# Patient Record
Sex: FEMALE | Race: WHITE | NOT HISPANIC OR LATINO | Employment: FULL TIME | URBAN - METROPOLITAN AREA
[De-identification: names, ages, dates, MRNs, and addresses within clinical notes are randomized per-mention and may not be internally consistent; named-entity substitution may affect disease eponyms.]

---

## 2020-08-30 ENCOUNTER — APPOINTMENT (OUTPATIENT)
Dept: RADIOLOGY | Facility: IMAGING CENTER | Age: 40
End: 2020-08-30
Attending: PHYSICIAN ASSISTANT
Payer: COMMERCIAL

## 2020-08-30 ENCOUNTER — OFFICE VISIT (OUTPATIENT)
Dept: URGENT CARE | Facility: CLINIC | Age: 40
End: 2020-08-30
Payer: COMMERCIAL

## 2020-08-30 VITALS
DIASTOLIC BLOOD PRESSURE: 72 MMHG | WEIGHT: 126 LBS | RESPIRATION RATE: 16 BRPM | HEART RATE: 111 BPM | HEIGHT: 64 IN | OXYGEN SATURATION: 99 % | BODY MASS INDEX: 21.51 KG/M2 | TEMPERATURE: 98.4 F | SYSTOLIC BLOOD PRESSURE: 98 MMHG

## 2020-08-30 DIAGNOSIS — S99.912A INJURY OF LEFT ANKLE, INITIAL ENCOUNTER: ICD-10-CM

## 2020-08-30 DIAGNOSIS — S93.402A SPRAIN OF LEFT ANKLE, UNSPECIFIED LIGAMENT, INITIAL ENCOUNTER: ICD-10-CM

## 2020-08-30 PROCEDURE — 73610 X-RAY EXAM OF ANKLE: CPT | Mod: TC,LT | Performed by: PHYSICIAN ASSISTANT

## 2020-08-30 PROCEDURE — 99203 OFFICE O/P NEW LOW 30 MIN: CPT | Performed by: PHYSICIAN ASSISTANT

## 2020-08-30 RX ORDER — TEMAZEPAM 15 MG/1
15 CAPSULE ORAL NIGHTLY PRN
COMMUNITY

## 2020-08-30 SDOH — HEALTH STABILITY: MENTAL HEALTH: HOW OFTEN DO YOU HAVE A DRINK CONTAINING ALCOHOL?: NEVER

## 2020-08-30 ASSESSMENT — ENCOUNTER SYMPTOMS
EYE REDNESS: 0
LOSS OF MOTION: 0
SORE THROAT: 0
MUSCLE WEAKNESS: 0
TINGLING: 0
EYE DISCHARGE: 0
FEVER: 0
NAUSEA: 0
NUMBNESS: 0
LOSS OF SENSATION: 0
HEADACHES: 0
COUGH: 0
SHORTNESS OF BREATH: 0
INABILITY TO BEAR WEIGHT: 0
VOMITING: 0

## 2020-08-30 NOTE — PROGRESS NOTES
Subjective:      Aby Hatfield is a 40 y.o. female who presents with Ankle Injury (rolled left ankle happened earlier, while using the stairs)            Ankle Injury   The incident occurred 3 to 6 hours ago. The incident occurred at home. The injury mechanism was an inversion injury and a fall. The pain is present in the left ankle. The pain is mild. The pain has been constant since onset. Pertinent negatives include no inability to bear weight, loss of motion, loss of sensation, muscle weakness, numbness or tingling. The symptoms are aggravated by weight bearing. She has tried acetaminophen for the symptoms.     The patient presents to clinic secondary to an injury to her left ankle.  The patient states she was walking down the stairs early this morning when she rolled her left ankle.  The patient states she developed immediate pain to the left ankle following the injury.  The patient states she has gradually developed swelling of the left ankle.  She reports no associated bruising.  No redness.  No numbness, tingling, or weakness.  The patient notes increased pain with walking.  The patient has applied an Ace wrap to the left ankle for additional support.  She has also taken Tylenol for her current symptoms.    PMH:  has no past medical history on file.  MEDS:   Current Outpatient Medications:   •  temazepam (RESTORIL) 15 MG Cap, Take 15 mg by mouth at bedtime as needed for Sleep., Disp: , Rfl:   ALLERGIES:   Allergies   Allergen Reactions   • Onion      anaphylaxis   • Sulfa Drugs      anaphylaxis     SURGHX: History reviewed. No pertinent surgical history.  SOCHX:  reports that she has been smoking. She uses smokeless tobacco. She reports that she does not drink alcohol.  FH: Family history was reviewed, no pertinent findings to report      Review of Systems   Constitutional: Negative for fever.   HENT: Negative for congestion, ear pain and sore throat.    Eyes: Negative for discharge and redness.  "  Respiratory: Negative for cough and shortness of breath.    Cardiovascular: Negative for chest pain and leg swelling.   Gastrointestinal: Negative for nausea and vomiting.   Musculoskeletal: Positive for joint pain (left ankle).   Skin: Negative for rash.   Neurological: Negative for tingling, numbness and headaches.   All other systems reviewed and are negative.         Objective:     BP (!) 98/72   Pulse (!) 111   Temp 36.9 °C (98.4 °F)   Resp 16   Ht 1.626 m (5' 4\")   Wt 57.2 kg (126 lb)   SpO2 99%   BMI 21.63 kg/m²      Physical Exam  Constitutional:       General: She is not in acute distress.     Appearance: Normal appearance. She is well-developed. She is not ill-appearing.   HENT:      Head: Normocephalic and atraumatic.      Right Ear: External ear normal.      Left Ear: External ear normal.      Nose: Nose normal.   Eyes:      Extraocular Movements: Extraocular movements intact.      Conjunctiva/sclera: Conjunctivae normal.   Neck:      Musculoskeletal: Normal range of motion and neck supple.   Cardiovascular:      Rate and Rhythm: Normal rate.   Pulmonary:      Effort: Pulmonary effort is normal.   Musculoskeletal:      Comments:   Left Ankle:  Tenderness to the lateral aspect of left ankle overlying the lateral malleolus.  No tenderness to the anterior or medial aspects of the left ankle.  No tenderness overlying the Achilles tendon.  No swelling.  No ecchymosis.  No erythema.  No increased warmth.  ROM intact -the patient demonstrates full active range of motion of the left ankle  Neurovascular intact  Strength 5/5 -dorsiflexion/plantarflexion against resistance  Normal gait   Skin:     General: Skin is warm and dry.   Neurological:      Mental Status: She is alert and oriented to person, place, and time.            Progress:  Left Ankle XR:   XRs reviewed by me.   COMPARISON: None     FINDINGS:  There is no focal soft tissue swelling.     There is no acute displaced fracture. The ankle mortise " is symmetric and there is no syndesmotic widening.     IMPRESSION:  Normal ankle series.    Provided the patient with ankle splint for additional support/stabilization     Assessment/Plan:        1. Injury of left ankle, initial encounter  - DX-ANKLE 3+ VIEWS LEFT; Future    2. Sprain of left ankle, unspecified ligament, initial encounter    Differential diagnoses, supportive care, and indications for immediate follow-up discussed with patient.   Instructed to return to clinic or nearest emergency department for any change in condition, further concerns, or worsening of symptoms.    OTC NSAIDs for pain/discomfort   RICE  Wear brace for additional support  Weight-bearing as tolerated  Follow-up with PCP   Return to clinic or go tot he ED if symptoms worsen or fail to improve, or if the patient should develop worsening/increasing pain/tenderness, swelling, bruising, redness or warmth to the affected area, decreased ROM, numbness, tingling or weakness, difficulty walking, fever/chills, and/or any concerning symptoms.     Discussed plan with the patient, and she agrees to the above.     Please note that this dictation was created using voice recognition software. I have made every reasonable attempt to correct obvious errors, but I expect that there may be errors of grammar and possibly content that I did not discover before finalizing the note.

## 2021-10-11 ENCOUNTER — OFFICE VISIT (OUTPATIENT)
Dept: URGENT CARE | Facility: CLINIC | Age: 41
End: 2021-10-11
Payer: COMMERCIAL

## 2021-10-11 ENCOUNTER — APPOINTMENT (OUTPATIENT)
Dept: RADIOLOGY | Facility: IMAGING CENTER | Age: 41
End: 2021-10-11
Attending: STUDENT IN AN ORGANIZED HEALTH CARE EDUCATION/TRAINING PROGRAM
Payer: COMMERCIAL

## 2021-10-11 VITALS
OXYGEN SATURATION: 98 % | HEART RATE: 88 BPM | BODY MASS INDEX: 23.63 KG/M2 | DIASTOLIC BLOOD PRESSURE: 60 MMHG | TEMPERATURE: 98.3 F | HEIGHT: 64 IN | RESPIRATION RATE: 16 BRPM | SYSTOLIC BLOOD PRESSURE: 106 MMHG | WEIGHT: 138.4 LBS

## 2021-10-11 DIAGNOSIS — M79.672 LEFT FOOT PAIN: ICD-10-CM

## 2021-10-11 DIAGNOSIS — S92.515A CLOSED NONDISPLACED FRACTURE OF PROXIMAL PHALANX OF LESSER TOE OF LEFT FOOT, INITIAL ENCOUNTER: ICD-10-CM

## 2021-10-11 PROBLEM — G89.29 CHRONIC PAIN: Status: ACTIVE | Noted: 2021-01-08

## 2021-10-11 PROBLEM — G47.00 INSOMNIA: Status: ACTIVE | Noted: 2021-01-08

## 2021-10-11 PROCEDURE — 73630 X-RAY EXAM OF FOOT: CPT | Mod: TC,LT | Performed by: STUDENT IN AN ORGANIZED HEALTH CARE EDUCATION/TRAINING PROGRAM

## 2021-10-11 PROCEDURE — 99214 OFFICE O/P EST MOD 30 MIN: CPT | Mod: 25 | Performed by: STUDENT IN AN ORGANIZED HEALTH CARE EDUCATION/TRAINING PROGRAM

## 2021-10-11 RX ORDER — CEFDINIR 300 MG/1
300 CAPSULE ORAL
COMMUNITY
Start: 2021-09-03

## 2021-10-11 RX ORDER — KETOROLAC TROMETHAMINE 30 MG/ML
30 INJECTION, SOLUTION INTRAMUSCULAR; INTRAVENOUS ONCE
Status: COMPLETED | OUTPATIENT
Start: 2021-10-11 | End: 2021-10-11

## 2021-10-11 RX ADMIN — KETOROLAC TROMETHAMINE 30 MG: 30 INJECTION, SOLUTION INTRAMUSCULAR; INTRAVENOUS at 13:21

## 2021-10-11 NOTE — PROGRESS NOTES
"Subjective:   CHIEF COMPLAINT  Chief Complaint   Patient presents with   • Toe Injury     (L) little toe brusied, swollen, sharp x last time ; tripped over dog door       HPI  Aby Hatfield is a 41 y.o. female who presents with chief complaint of pain on her left fifth toe. The patient was stepping over a dog gate and hit her toe against a door frame.  She developed bruising and swelling.  Now she is having persistent pain aggravated with palpation and weightbearing.  She has tried ibuprofen 800 mg and Tylenol 1000 mg which not helped.  She is also tried using ice which is not helped.  No additional history of trauma to that region.  No history of surgery.    REVIEW OF SYSTEMS  General: no fever or chills  GI: no nausea or vomiting  See HPI for further details.    PAST MEDICAL HISTORY       SURGICAL HISTORY  patient denies any surgical history    ALLERGIES  Allergies   Allergen Reactions   • Onion Anaphylaxis     anaphylaxis  allergic to onions     • Aspirin      Other reaction(s): Other (See Comments)  \"acid reflux like reaction\".  States similar reaction as she had to onions.   • Iodine      Other reaction(s): .Unknown  topical iodine   • Sulfa Drugs Anaphylaxis     anaphylaxis  Other reaction(s): Unknown  Other reaction(s): Other (See Comments)  \"mom is allergic to sulfa\" - possiblel allergy       CURRENT MEDICATIONS  Home Medications     Reviewed by Geoff Wisdom Ass't (Medical Assistant) on 10/11/21 at 1159  Med List Status: <None>   Medication Last Dose Status   cefdinir (OMNICEF) 300 MG Cap Not Taking Active   temazepam (RESTORIL) 15 MG Cap Taking Active                SOCIAL HISTORY  Social History     Tobacco Use   • Smoking status: Current Some Day Smoker   • Smokeless tobacco: Former User   Vaping Use   • Vaping Use: Every day   • Substances: Nicotine   Substance and Sexual Activity   • Alcohol use: Not Currently     Comment: occ    • Drug use: Never   • Sexual activity: Not on file " "      FAMILY HISTORY  No family history on file.       Objective:   PHYSICAL EXAM  VITAL SIGNS: /60 (BP Location: Left arm, Patient Position: Sitting)   Pulse 88   Temp 36.8 °C (98.3 °F) (Temporal)   Resp 16   Ht 1.626 m (5' 4\")   Wt 62.8 kg (138 lb 6.4 oz)   SpO2 98%   BMI 23.76 kg/m²     Gen: no acute distress, normal voice  Psych: normal affect, normal judgement, alert, awake  Skin: dry, intact, moist mucosal membranes  Lungs: CTAB w/ symmetric expansion  CV: RRR w/o murmurs or clicks  MSK: Left foot: Ecchymosis and edema along the dorsal surface, lateral margins of the foot, most notable over the fifth MTPJ.  TTP along the distal one third of the fourth metacarpal and fifth digit.        RADIOLOGY RESULTS   DX-FOOT-COMPLETE 3+ LEFT    Result Date: 10/11/2021  10/11/2021 12:25 PM HISTORY/REASON FOR EXAM:  Pain/Deformity Following Trauma; hit foot against wall. Ecchymosis lateral foot. TTP distal 1/3 4th metacarpal and 5th phalanx TECHNIQUE/EXAM DESCRIPTION AND NUMBER OF VIEWS: 3 views of the LEFT foot. COMPARISON:  None. FINDINGS:  There is an obliquely oriented non angulated or displaced fracture of the proximal fifth phalanx. There is no definite intra-articular extension.     Fifth proximal phalangeal fracture. No evidence of intra-articular extension.             Assessment/Plan:     1. Left foot pain  DX-FOOT-COMPLETE 3+ LEFT    ketorolac (TORADOL) injection 30 mg   2. Closed nondisplaced fracture of proximal phalanx of lesser toe of left foot, initial encounter     Multiple views of the left foot demonstrated a comminuted, nondisplaced fracture along the middle third of the fifth proximal phalanx.  Informed the patient it can take anywhere from 4 to 6 weeks for full healing.  -Toradol 30 mg IM x1 given in the clinic  -Placed in a walking boot  -If no longer having pain with ambulation within 1 to 2 weeks okay to tay tape  -Ibuprofen 800 mg 3 times daily and/or Tylenol 1000 mg 3 times " daily  -Instructed to follow-up with PCP within 2 to 3 weeks for reevaluation  -Encourage patient to purchase an even-up off of [employer] to wear on the right foot  -Follow-up as needed    Differential diagnosis, natural history, supportive care, and indications for immediate follow-up discussed. All questions answered. Patient agrees with the plan of care.    Follow-up as needed if symptoms worsen or fail to improve to PCP, Urgent care or Emergency Room.    Please note that this dictation was created using voice recognition software. I have made a reasonable attempt to correct obvious errors, but I expect that there are errors of grammar and possibly content that I did not discover before finalizing the note.

## 2021-10-25 ENCOUNTER — APPOINTMENT (RX ONLY)
Dept: URBAN - METROPOLITAN AREA CLINIC 94 | Facility: CLINIC | Age: 41
Setting detail: DERMATOLOGY
End: 2021-10-25

## 2021-10-25 DIAGNOSIS — Z41.9 ENCOUNTER FOR PROCEDURE FOR PURPOSES OTHER THAN REMEDYING HEALTH STATE, UNSPECIFIED: ICD-10-CM

## 2021-10-25 PROCEDURE — ? PALOMAR ICON LASER

## 2021-10-25 ASSESSMENT — LOCATION DETAILED DESCRIPTION DERM: LOCATION DETAILED: LEFT SUPRAPUBIC SKIN

## 2021-10-25 ASSESSMENT — LOCATION ZONE DERM: LOCATION ZONE: TRUNK

## 2021-10-25 ASSESSMENT — LOCATION SIMPLE DESCRIPTION DERM: LOCATION SIMPLE: GROIN

## 2021-10-25 NOTE — PROCEDURE: PALOMAR ICON LASER
Fluence: 26
Number Of Passes: 1
Anesthesia Type: 1% lidocaine with epinephrine
Fluence: 25
Pre-Procedure Text: The patient's skin was cleaned and ultrasound gel applied.
Fluence: 24
Post-Care Instructions: I reviewed with the patient in detail post-care instructions. Patient should stay away from the sun and wear sun protection until treated areas are fully healed.
Fluence Units: J/cm2
Pulse Duration (In Milliseconds): 20
Consent: Written consent obtained, risks reviewed including but not limited to crusting, scabbing, blistering, scarring, darker or lighter pigmentary change, bruising, and/or incomplete response.
External Cooling Fan Speed: 5
Pulse Duration (In Milliseconds): 20
Price (Use Numbers Only, No Special Characters Or $): 0
Treated Area: small area
Detail Level: Simple
Render Post Care In The Note?: yes

## 2021-12-03 ENCOUNTER — APPOINTMENT (RX ONLY)
Dept: URBAN - METROPOLITAN AREA CLINIC 94 | Facility: CLINIC | Age: 41
Setting detail: DERMATOLOGY
End: 2021-12-03

## 2021-12-03 DIAGNOSIS — Z41.9 ENCOUNTER FOR PROCEDURE FOR PURPOSES OTHER THAN REMEDYING HEALTH STATE, UNSPECIFIED: ICD-10-CM

## 2021-12-03 PROCEDURE — ? PALOMAR ICON LASER

## 2021-12-03 ASSESSMENT — LOCATION ZONE DERM: LOCATION ZONE: LEG

## 2021-12-03 ASSESSMENT — LOCATION SIMPLE DESCRIPTION DERM: LOCATION SIMPLE: LEFT THIGH

## 2021-12-03 ASSESSMENT — LOCATION DETAILED DESCRIPTION DERM: LOCATION DETAILED: LEFT ANTERIOR PROXIMAL THIGH

## 2021-12-03 NOTE — PROCEDURE: PALOMAR ICON LASER
Pulse Duration (In Milliseconds): 20
Consent: Written consent obtained, risks reviewed including but not limited to crusting, scabbing, blistering, scarring, darker or lighter pigmentary change, bruising, and/or incomplete response.
Number Of Passes: 1
Treated Area: small area
Pre-Procedure Text: The patient's skin was cleaned and ultrasound gel applied.
Fluence: 26
Render Post Care In The Note?: yes
Fluence: 25
Anesthesia Type: 1% lidocaine with epinephrine
Fluence: 24
Detail Level: Simple
Fluence Units: J/cm2
Price (Use Numbers Only, No Special Characters Or $): 0
Post-Care Instructions: I reviewed with the patient in detail post-care instructions. Patient should stay away from the sun and wear sun protection until treated areas are fully healed.
Pulse Duration (In Milliseconds): 20
External Cooling Fan Speed: 5

## 2022-01-10 ENCOUNTER — APPOINTMENT (RX ONLY)
Dept: URBAN - METROPOLITAN AREA CLINIC 94 | Facility: CLINIC | Age: 42
Setting detail: DERMATOLOGY
End: 2022-01-10

## 2022-01-10 DIAGNOSIS — Z41.9 ENCOUNTER FOR PROCEDURE FOR PURPOSES OTHER THAN REMEDYING HEALTH STATE, UNSPECIFIED: ICD-10-CM

## 2022-01-10 PROCEDURE — ? PALOMAR ICON LASER

## 2022-01-10 ASSESSMENT — LOCATION SIMPLE DESCRIPTION DERM
LOCATION SIMPLE: GROIN
LOCATION SIMPLE: RIGHT AXILLARY VAULT

## 2022-01-10 ASSESSMENT — LOCATION DETAILED DESCRIPTION DERM
LOCATION DETAILED: LEFT SUPRAPUBIC SKIN
LOCATION DETAILED: RIGHT AXILLARY VAULT

## 2022-01-10 ASSESSMENT — LOCATION ZONE DERM
LOCATION ZONE: AXILLAE
LOCATION ZONE: TRUNK

## 2022-01-10 NOTE — PROCEDURE: PALOMAR ICON LASER
Treated Area: small area
Number Of Passes: 1
Fluence: 25
Fluence: 24
Fluence Units: J/cm2
Anesthesia Type: 1% lidocaine with epinephrine
Fluence: 20
Pre-Procedure Text: The patient's skin was cleaned and ultrasound gel applied.
Post-Care Instructions: I reviewed with the patient in detail post-care instructions. Patient should stay away from the sun and wear sun protection until treated areas are fully healed.
Detail Level: Simple
Consent: Written consent obtained, risks reviewed including but not limited to crusting, scabbing, blistering, scarring, darker or lighter pigmentary change, bruising, and/or incomplete response.
Pulse Duration (In Milliseconds): 20
External Cooling Fan Speed: 5
Price (Use Numbers Only, No Special Characters Or $): 0
Render Post Care In The Note?: yes

## 2022-05-03 ENCOUNTER — APPOINTMENT (RX ONLY)
Dept: URBAN - METROPOLITAN AREA CLINIC 93 | Facility: CLINIC | Age: 42
Setting detail: DERMATOLOGY
End: 2022-05-03

## 2022-05-03 DIAGNOSIS — Z41.9 ENCOUNTER FOR PROCEDURE FOR PURPOSES OTHER THAN REMEDYING HEALTH STATE, UNSPECIFIED: ICD-10-CM

## 2022-05-03 PROCEDURE — ? PALOMAR ICON LASER

## 2022-05-03 ASSESSMENT — LOCATION DETAILED DESCRIPTION DERM
LOCATION DETAILED: LEFT SUPRAPUBIC SKIN
LOCATION DETAILED: RIGHT AXILLARY VAULT

## 2022-05-03 ASSESSMENT — LOCATION ZONE DERM
LOCATION ZONE: AXILLAE
LOCATION ZONE: TRUNK

## 2022-05-03 ASSESSMENT — LOCATION SIMPLE DESCRIPTION DERM
LOCATION SIMPLE: GROIN
LOCATION SIMPLE: RIGHT AXILLARY VAULT

## 2022-07-24 NOTE — PROCEDURE: PALOMAR ICON LASER
Fluence: 24
Treatment Number: 1
Price (Use Numbers Only, No Special Characters Or $): 0
External Cooling Fan Speed: 5
Fluence Units: J/cm2
Fluence: 26
Post-Care Instructions: I reviewed with the patient in detail post-care instructions. Patient should stay away from the sun and wear sun protection until treated areas are fully healed.
Treated Area: small area
Pulse Duration (In Milliseconds): 20
Consent: Written consent obtained, risks reviewed including but not limited to crusting, scabbing, blistering, scarring, darker or lighter pigmentary change, bruising, and/or incomplete response.
Pre-Procedure Text: The patient's skin was cleaned and ultrasound gel applied.
Anesthesia Type: 1% lidocaine with epinephrine
Render Post Care In The Note?: yes
Detail Level: Simple
Pulse Duration (In Milliseconds): 20
Fluence: 25
Pulse Duration (In Milliseconds): 28
[id number]

## 2023-08-15 ENCOUNTER — APPOINTMENT (RX ONLY)
Dept: URBAN - METROPOLITAN AREA CLINIC 373 | Facility: CLINIC | Age: 43
Setting detail: DERMATOLOGY
End: 2023-08-15

## 2023-08-15 DIAGNOSIS — D22 MELANOCYTIC NEVI: ICD-10-CM

## 2023-08-15 DIAGNOSIS — D18.0 HEMANGIOMA: ICD-10-CM

## 2023-08-15 DIAGNOSIS — L82.1 OTHER SEBORRHEIC KERATOSIS: ICD-10-CM

## 2023-08-15 DIAGNOSIS — L81.4 OTHER MELANIN HYPERPIGMENTATION: ICD-10-CM

## 2023-08-15 DIAGNOSIS — L81.5 LEUKODERMA, NOT ELSEWHERE CLASSIFIED: ICD-10-CM

## 2023-08-15 DIAGNOSIS — L81.9 DISORDER OF PIGMENTATION, UNSPECIFIED: ICD-10-CM | Status: STABLE

## 2023-08-15 DIAGNOSIS — L72.0 EPIDERMAL CYST: ICD-10-CM

## 2023-08-15 DIAGNOSIS — L73.8 OTHER SPECIFIED FOLLICULAR DISORDERS: ICD-10-CM

## 2023-08-15 DIAGNOSIS — L82.0 INFLAMED SEBORRHEIC KERATOSIS: ICD-10-CM

## 2023-08-15 PROBLEM — D22.5 MELANOCYTIC NEVI OF TRUNK: Status: ACTIVE | Noted: 2023-08-15

## 2023-08-15 PROBLEM — D18.01 HEMANGIOMA OF SKIN AND SUBCUTANEOUS TISSUE: Status: ACTIVE | Noted: 2023-08-15

## 2023-08-15 PROCEDURE — ? MEDICATION COUNSELING

## 2023-08-15 PROCEDURE — ? TREATMENT REGIMEN

## 2023-08-15 PROCEDURE — 99203 OFFICE O/P NEW LOW 30 MIN: CPT | Mod: 25

## 2023-08-15 PROCEDURE — 17111 DESTRUCTION B9 LESIONS 15/>: CPT

## 2023-08-15 PROCEDURE — ? FULL BODY SKIN EXAM

## 2023-08-15 PROCEDURE — ? COUNSELING

## 2023-08-15 PROCEDURE — ? ADDITIONAL NOTES

## 2023-08-15 PROCEDURE — ? BENIGN DESTRUCTION

## 2023-08-15 PROCEDURE — ? ELECTRODESICCATION

## 2023-08-15 PROCEDURE — ? IN-HOUSE DISPENSING PHARMACY

## 2023-08-15 ASSESSMENT — LOCATION DETAILED DESCRIPTION DERM
LOCATION DETAILED: LEFT ANTERIOR DISTAL THIGH
LOCATION DETAILED: RIGHT ANTERIOR DISTAL UPPER ARM
LOCATION DETAILED: RIGHT INFERIOR LID MARGIN
LOCATION DETAILED: RIGHT SUPERIOR MEDIAL LOWER BACK
LOCATION DETAILED: RIGHT ANTERIOR PROXIMAL THIGH
LOCATION DETAILED: RIGHT INFERIOR ANTERIOR NECK
LOCATION DETAILED: RIGHT PROXIMAL PRETIBIAL REGION
LOCATION DETAILED: LEFT ANTERIOR DISTAL UPPER ARM
LOCATION DETAILED: RIGHT INFERIOR LATERAL MALAR CHEEK
LOCATION DETAILED: RIGHT MEDIAL SUPERIOR CHEST
LOCATION DETAILED: UPPER STERNUM
LOCATION DETAILED: LEFT INFERIOR LATERAL MALAR CHEEK
LOCATION DETAILED: LEFT MEDIAL FOREHEAD
LOCATION DETAILED: LEFT PROXIMAL PRETIBIAL REGION
LOCATION DETAILED: RIGHT LATERAL SUPERIOR CHEST
LOCATION DETAILED: RIGHT ANTERIOR PROXIMAL UPPER ARM
LOCATION DETAILED: LEFT SUPERIOR MEDIAL BUCCAL CHEEK
LOCATION DETAILED: RIGHT INFERIOR MEDIAL UPPER BACK
LOCATION DETAILED: RIGHT SUPERIOR MEDIAL UPPER BACK
LOCATION DETAILED: RIGHT MEDIAL MALAR CHEEK
LOCATION DETAILED: RIGHT INFERIOR MEDIAL MALAR CHEEK

## 2023-08-15 ASSESSMENT — LOCATION ZONE DERM
LOCATION ZONE: FACE
LOCATION ZONE: NECK
LOCATION ZONE: ARM
LOCATION ZONE: EYELID
LOCATION ZONE: LEG
LOCATION ZONE: TRUNK

## 2023-08-15 ASSESSMENT — LOCATION SIMPLE DESCRIPTION DERM
LOCATION SIMPLE: RIGHT UPPER BACK
LOCATION SIMPLE: RIGHT CHEEK
LOCATION SIMPLE: CHEST
LOCATION SIMPLE: LEFT CHEEK
LOCATION SIMPLE: RIGHT THIGH
LOCATION SIMPLE: RIGHT PRETIBIAL REGION
LOCATION SIMPLE: LEFT THIGH
LOCATION SIMPLE: RIGHT INFERIOR EYELID
LOCATION SIMPLE: RIGHT UPPER ARM
LOCATION SIMPLE: LEFT FOREHEAD
LOCATION SIMPLE: LEFT PRETIBIAL REGION
LOCATION SIMPLE: RIGHT LOWER BACK
LOCATION SIMPLE: RIGHT ANTERIOR NECK
LOCATION SIMPLE: LEFT UPPER ARM

## 2023-08-15 NOTE — PROCEDURE: MEDICATION COUNSELING
Itraconazole Counseling:  I discussed with the patient the risks of itraconazole including but not limited to liver damage, nausea/vomiting, neuropathy, and severe allergy.  The patient understands that this medication is best absorbed when taken with acidic beverages such as non-diet cola or ginger ale.  The patient understands that monitoring is required including baseline LFTs and repeat LFTs at intervals.  The patient understands that they are to contact us or the primary physician if concerning signs are noted. Protopic Counseling: Patient may experience a mild burning sensation during topical application. Protopic is not approved in children less than 2 years of age. There have been case reports of hematologic and skin malignancies in patients using topical calcineurin inhibitors although causality is questionable.

## 2023-08-15 NOTE — PROCEDURE: BENIGN DESTRUCTION
Post-Care Instructions: I reviewed with the patient in detail post-care instructions. Patient is to wear sunprotection, and avoid picking at any of the treated lesions. Pt may apply Vaseline to crusted or scabbing areas.
Medical Necessity Clause: This procedure was medically necessary because the lesions that were treated were:
Consent: The patient's consent was obtained including but not limited to risks of crusting, scabbing, blistering, scarring, darker or lighter pigmentary change, recurrence, incomplete removal and infection.
Medical Necessity Information: It is in your best interest to select a reason for this procedure from the list below. All of these items fulfill various CMS LCD requirements except the new and changing color options.
Detail Level: Zone
Add 52 Modifier (Optional): no
Anesthesia Volume In Cc: 0
Total Number Of Lesions Treated: 6
Total Number Of Lesions Treated: 1

## 2023-08-15 NOTE — PROCEDURE: MIPS QUALITY
Quality 110: Preventive Care And Screening: Influenza Immunization: Influenza Immunization Administered during Influenza season
Quality 47: Advance Care Plan: Advance care planning not documented, reason not otherwise specified.
Quality 431: Preventive Care And Screening: Unhealthy Alcohol Use - Screening: Patient not identified as an unhealthy alcohol user when screened for unhealthy alcohol use using a systematic screening method
Quality 130: Documentation Of Current Medications In The Medical Record: Current Medications Documented
Quality 226: Preventive Care And Screening: Tobacco Use: Screening And Cessation Intervention: Patient screened for tobacco use and is an ex/non-smoker
Detail Level: Detailed

## 2023-08-15 NOTE — PROCEDURE: MEDICATION COUNSELING
Xelazraz Pregnancy And Lactation Text: This medication is Pregnancy Category D and is not considered safe during pregnancy.  The risk during breast feeding is also uncertain.

## 2023-08-15 NOTE — PROCEDURE: MEDICATION COUNSELING
generalized Zyclara Counseling:  I discussed with the patient the risks of imiquimod including but not limited to erythema, scaling, itching, weeping, crusting, and pain.  Patient understands that the inflammatory response to imiquimod is variable from person to person and was educated regarded proper titration schedule.  If flu-like symptoms develop, patient knows to discontinue the medication and contact us.

## 2023-08-15 NOTE — PROCEDURE: IN-HOUSE DISPENSING PHARMACY
Product 53 Amount/Unit (Numbers Only): 0
Product 46 Unit Type: mg
Product 4 Refills: 2
Product 2 Application Directions: Mix with your Retin-A and apply at night
Product 3 Price/Unit (In Dollars): 125
Product 7 Price/Unit (In Dollars): 128
Product 8 Unit Type: ml
Name Of Product 4: Tretinoin 0.025% Cream
Product 5 Unit Type: grams
Product 4 Amount/Unit (Numbers Only): 20
Product 5 Price/Unit (In Dollars): 65
Name Of Product 6: Tretinoin 0.1% Cream
Product 8 Application Directions: Apply along lash line with applicator nightly for 3 months, then decrease to 3 nights weekly for maintenance
Name Of Product 1: Zo Pigment Control Cream (Melamin)
Name Of Product 5: Tretinoin 0.05% Cream
Product 3 Application Directions: Apply topically to full face every morning
Product 5 Application Directions: Apply a pea sized amount to full face avoiding the eye area. Start 3 nights weekly and work up to nightly as tolerated.
Product 8 Price/Unit (In Dollars): 175
Product 7 Amount/Unit (Numbers Only): 3
Product 1 Units Dispensed: 1
Product 4 Price/Unit (In Dollars): 60
Product 2 Amount/Unit (Numbers Only): 80
Name Of Product 8: Latisse 5mL
Name Of Product 7: Latisse 3mL
Name Of Product 2: Zo Pigment Control and Blending Cream (Melamix)
Product 6 Price/Unit (In Dollars): 70
Render Refills If Set To 0: Yes
Detail Level: Zone
Product 8 Amount/Unit (Numbers Only): 5
Product 2 Price/Unit (In Dollars): 64
Product 1 Price/Unit (In Dollars): 66
Name Of Product 3: Zo Pigment Control and Brightening Cream (Melamin C)

## 2023-08-15 NOTE — PROCEDURE: ELECTRODESICCATION
Consent: The patient's consent was obtained including but not limited to risks of crusting, scabbing, blistering, scarring, darker or lighter pigmentary change, recurrence, incomplete removal and infection.
Medical Necessity Clause: This procedure was medically necessary because the lesions that were treated were:
Anesthesia Type: 1% lidocaine with epinephrine
Post-Care Instructions: I reviewed with the patient in detail post-care instructions. Patient is to wear sunprotection, and avoid picking at any of the treated lesions. Pt may apply Vaseline to crusted or scabbing areas
Detail Level: Simple
Medical Necessity Information: It is in your best interest to select a reason for this procedure from the list below. All of these items fulfill various CMS LCD requirements except the new and changing color options.
Include Z78.9 (Other Specified Conditions Influencing Health Status) As An Associated Diagnosis?: No

## 2023-10-31 NOTE — PROCEDURE: MEDICATION COUNSELING
Size Of Lesion In Cm: 0 Doxycycline Pregnancy And Lactation Text: This medication is Pregnancy Category D and not consider safe during pregnancy. It is also excreted in breast milk but is considered safe for shorter treatment courses.